# Patient Record
Sex: MALE | URBAN - METROPOLITAN AREA
[De-identification: names, ages, dates, MRNs, and addresses within clinical notes are randomized per-mention and may not be internally consistent; named-entity substitution may affect disease eponyms.]

---

## 2020-11-12 ENCOUNTER — OFFICE VISIT (OUTPATIENT)
Dept: URBAN - METROPOLITAN AREA CLINIC 91 | Facility: CLINIC | Age: 25
End: 2020-11-12

## 2020-11-12 DIAGNOSIS — H52.13 MYOPIA, BILATERAL: Primary | ICD-10-CM

## 2020-11-12 PROCEDURE — 92025 CPTRIZED CORNEAL TOPOGRAPHY: CPT | Performed by: SPECIALIST

## 2020-12-08 ENCOUNTER — OFFICE VISIT (OUTPATIENT)
Dept: URBAN - METROPOLITAN AREA CLINIC 91 | Facility: CLINIC | Age: 25
End: 2020-12-08

## 2020-12-08 PROCEDURE — 92025 CPTRIZED CORNEAL TOPOGRAPHY: CPT | Performed by: SPECIALIST

## 2020-12-08 RX ORDER — ALPRAZOLAM 1 MG/1
1 MG TABLET ORAL
Qty: 3 | Refills: 0 | Status: ACTIVE
Start: 2020-12-08

## 2020-12-08 RX ORDER — PREDNISOLONE ACETATE 10 MG/ML
1 % SUSPENSION/ DROPS OPHTHALMIC
Qty: 10 | Refills: 1 | Status: ACTIVE
Start: 2020-12-08

## 2020-12-08 RX ORDER — OFLOXACIN 3 MG/ML
0.3 % SOLUTION/ DROPS OPHTHALMIC
Qty: 10 | Refills: 1 | Status: ACTIVE
Start: 2020-12-08

## 2020-12-08 NOTE — IMPRESSION/PLAN
Impression: Myopia, bilateral: H52.13. Plan: Patient interested for Lasik OU,  discussed diagnosis in detail with patient. Patient LASIK candidate. Patient elects to have surgery. Discussed risks/benefits of laser [place]. Discussed the need for touch up, dry eyes and glare explained.

## 2020-12-09 ENCOUNTER — PROCEDURE (OUTPATIENT)
Dept: URBAN - METROPOLITAN AREA CLINIC 91 | Facility: CLINIC | Age: 25
End: 2020-12-09

## 2020-12-09 DIAGNOSIS — H52.7 DISORDER OF REFRACTION: Primary | ICD-10-CM

## 2020-12-09 PROCEDURE — CVLSK CVLSK: CUSTOM | Performed by: SPECIALIST

## 2020-12-10 ENCOUNTER — POST-OPERATIVE VISIT (OUTPATIENT)
Dept: URBAN - METROPOLITAN AREA CLINIC 91 | Facility: CLINIC | Age: 25
End: 2020-12-10

## 2020-12-10 PROCEDURE — 99024 POSTOP FOLLOW-UP VISIT: CPT | Performed by: OPHTHALMOLOGY

## 2020-12-10 NOTE — IMPRESSION/PLAN
Impression: S/P LASIK - [facility]vue OU - 1 Day. Encounter for surgical aftercare following surgery on a sense organ  Z48.810. Plan: Patient healing well status post LASIK. I have stressed the importance of patient not rubbing eye and avoiding swimming. Patient should wear protective eye shields while sleeping for 2 weeks. I went over post-op medication schedule with patient. Patient to continue with antibiotic drops and steroid drops 4-8 times per day x 10 days then discontinue. Patient should also begin using a good quality preservative-free artificial tear 6-8 times per day.

## 2020-12-17 ENCOUNTER — POST-OPERATIVE VISIT (OUTPATIENT)
Dept: URBAN - METROPOLITAN AREA CLINIC 91 | Facility: CLINIC | Age: 25
End: 2020-12-17

## 2020-12-17 PROCEDURE — 99024 POSTOP FOLLOW-UP VISIT: CPT | Performed by: OPHTHALMOLOGY

## 2020-12-17 NOTE — IMPRESSION/PLAN
Impression: S/P LASIK - Customvue OU - 8 Days. Encounter for surgical aftercare following surgery on a sense organ  Z48.810. Post operative instructions reviewed - Condition is improving - Plan: Patient healing well status post LASIK . I have advised patient to avoid continue to swimming pools and to wear protective eye shields while sleeping for another week. Patient should continue post op meds until finished with full 10 day course. I also recommend they continue with a good quality preservative-free artificial tear 6-8 times per day.

## 2021-01-21 ENCOUNTER — POST-OPERATIVE VISIT (OUTPATIENT)
Dept: URBAN - METROPOLITAN AREA CLINIC 91 | Facility: CLINIC | Age: 26
End: 2021-01-21

## 2021-01-21 PROCEDURE — 99024 POSTOP FOLLOW-UP VISIT: CPT | Performed by: OPHTHALMOLOGY

## 2021-01-21 NOTE — IMPRESSION/PLAN
Impression: S/P LASIK - Customvue OU - 43 Days. Encounter for surgical aftercare following surgery on a sense organ  Z48.810. Post operative instructions reviewed - Condition is improving - Plan: Patient doing well status post LASIK in both eyes. I recommend patient continue with a good quality artificial tear 4-6 times per day. Patient reports poor compliance with post-op gtts. Patient is using Prednisolone OU BID and ATs OU QID.

## 2021-06-24 ENCOUNTER — OFFICE VISIT (OUTPATIENT)
Dept: URBAN - METROPOLITAN AREA CLINIC 91 | Facility: CLINIC | Age: 26
End: 2021-06-24

## 2021-06-24 DIAGNOSIS — Z48.810 ENCOUNTER FOR SURGICAL AFTERCARE FOLLOWING SURGERY ON A SENSE ORGAN: Primary | ICD-10-CM

## 2021-06-24 PROCEDURE — 99024 POSTOP FOLLOW-UP VISIT: CPT | Performed by: OPHTHALMOLOGY

## 2021-06-24 NOTE — IMPRESSION/PLAN
Impression: Encounter for surgical aftercare following surgery on a sense organ: Z48.810 OU. Plan: Patient doing well status post LASIK in both eyes. I recommend patient continue with a good quality artificial tear 2-4 times per day.